# Patient Record
Sex: MALE | Race: BLACK OR AFRICAN AMERICAN | NOT HISPANIC OR LATINO | Employment: FULL TIME | URBAN - METROPOLITAN AREA
[De-identification: names, ages, dates, MRNs, and addresses within clinical notes are randomized per-mention and may not be internally consistent; named-entity substitution may affect disease eponyms.]

---

## 2021-03-31 ENCOUNTER — HOSPITAL ENCOUNTER (EMERGENCY)
Facility: HOSPITAL | Age: 31
Discharge: HOME | End: 2021-03-31
Attending: EMERGENCY MEDICINE
Payer: MEDICARE

## 2021-03-31 VITALS
DIASTOLIC BLOOD PRESSURE: 84 MMHG | BODY MASS INDEX: 36.37 KG/M2 | SYSTOLIC BLOOD PRESSURE: 148 MMHG | OXYGEN SATURATION: 99 % | WEIGHT: 240 LBS | TEMPERATURE: 98 F | RESPIRATION RATE: 18 BRPM | HEART RATE: 87 BPM | HEIGHT: 68 IN

## 2021-03-31 DIAGNOSIS — R59.1 LYMPHADENOPATHY OF HEAD AND NECK: Primary | ICD-10-CM

## 2021-03-31 DIAGNOSIS — I10 HYPERTENSION, UNSPECIFIED TYPE: ICD-10-CM

## 2021-03-31 DIAGNOSIS — J30.9 ALLERGIC RHINITIS, UNSPECIFIED SEASONALITY, UNSPECIFIED TRIGGER: ICD-10-CM

## 2021-03-31 PROCEDURE — 99284 EMERGENCY DEPT VISIT MOD MDM: CPT | Mod: 25,ER

## 2021-03-31 RX ORDER — CETIRIZINE HYDROCHLORIDE 10 MG/1
10 TABLET ORAL DAILY
Qty: 30 TABLET | Refills: 0 | Status: SHIPPED | OUTPATIENT
Start: 2021-03-31 | End: 2022-03-31

## 2021-03-31 RX ORDER — DEXTROMETHORPHAN HYDROBROMIDE, GUAIFENESIN 5; 100 MG/5ML; MG/5ML
650 LIQUID ORAL EVERY 8 HOURS
Qty: 20 TABLET | Refills: 0 | Status: SHIPPED | OUTPATIENT
Start: 2021-03-31

## 2021-03-31 RX ORDER — IBUPROFEN 600 MG/1
600 TABLET ORAL EVERY 6 HOURS PRN
Qty: 20 TABLET | Refills: 0 | Status: SHIPPED | OUTPATIENT
Start: 2021-03-31 | End: 2022-12-24 | Stop reason: ALTCHOICE

## 2021-03-31 RX ORDER — FLUTICASONE PROPIONATE 50 MCG
1 SPRAY, SUSPENSION (ML) NASAL 2 TIMES DAILY PRN
Qty: 15 G | Refills: 0 | Status: SHIPPED | OUTPATIENT
Start: 2021-03-31

## 2022-12-24 ENCOUNTER — HOSPITAL ENCOUNTER (EMERGENCY)
Facility: HOSPITAL | Age: 32
Discharge: HOME | End: 2022-12-24
Attending: EMERGENCY MEDICINE
Payer: MEDICARE

## 2022-12-24 VITALS
RESPIRATION RATE: 18 BRPM | TEMPERATURE: 99 F | BODY MASS INDEX: 31.1 KG/M2 | HEART RATE: 89 BPM | DIASTOLIC BLOOD PRESSURE: 91 MMHG | SYSTOLIC BLOOD PRESSURE: 124 MMHG | HEIGHT: 69 IN | OXYGEN SATURATION: 99 % | WEIGHT: 210 LBS

## 2022-12-24 DIAGNOSIS — S92.421A CLOSED DISPLACED FRACTURE OF DISTAL PHALANX OF RIGHT GREAT TOE, INITIAL ENCOUNTER: Primary | ICD-10-CM

## 2022-12-24 DIAGNOSIS — M79.674 GREAT TOE PAIN, RIGHT: ICD-10-CM

## 2022-12-24 PROCEDURE — 99284 EMERGENCY DEPT VISIT MOD MDM: CPT | Mod: ER

## 2022-12-24 RX ORDER — NAPROXEN 500 MG/1
500 TABLET ORAL 2 TIMES DAILY WITH MEALS
Qty: 20 TABLET | Refills: 0 | Status: SHIPPED | OUTPATIENT
Start: 2022-12-24 | End: 2023-01-03

## 2022-12-24 RX ORDER — DICLOFENAC SODIUM 10 MG/G
GEL TOPICAL
Qty: 100 G | Refills: 0 | Status: SHIPPED | OUTPATIENT
Start: 2022-12-24

## 2022-12-24 NOTE — Clinical Note
"Roberto Carlos DOYLE" Stevan was seen and treated in our emergency department on 12/24/2022.  He may return to work on 12/31/2022.       If you have any questions or concerns, please don't hesitate to call.      CLIFF Espinoza RN    "

## 2022-12-24 NOTE — ED PROVIDER NOTES
Encounter Date: 12/24/2022       History     Chief Complaint   Patient presents with    Toe Pain     PT C/O PAIN AND BRUISING TO RIGHT GREAT TOE AFTER HEAVY OBJECT FELL OFF OF REFRIDGERATOR ONTO TOE THIS AFTERNOON     The history is provided by the patient.   Leg Pain   The incident occurred at home. The injury mechanism was a direct blow (container fell from refrigerator directly onto patient's right great toe). The incident occurred yesterday. The pain is present in the right toes (right great toe). The pain has been Constant since onset. Pertinent negatives include no numbness, no inability to bear weight, no loss of motion, no muscle weakness, no loss of sensation and no tingling. He reports no foreign bodies present. The symptoms are aggravated by palpation and activity. He has tried nothing for the symptoms.   Review of patient's allergies indicates:  No Known Allergies  Past Medical History:   Diagnosis Date    Hypertension      History reviewed. No pertinent surgical history.  No family history on file.  Social History     Tobacco Use    Smoking status: Never   Substance Use Topics    Alcohol use: No    Drug use: Never     Review of Systems   Musculoskeletal:  Negative for arthralgias, gait problem and joint swelling.   Skin:  Negative for wound.   Neurological:  Negative for tingling, weakness and numbness.   All other systems reviewed and are negative.    Physical Exam     Initial Vitals [12/24/22 0024]   BP Pulse Resp Temp SpO2   (!) 135/95 93 18 98.6 °F (37 °C) 99 %      MAP       --         Physical Exam    Nursing note and vitals reviewed.  Constitutional: He appears well-developed and well-nourished. He is not diaphoretic. No distress.   HENT:   Head: Normocephalic and atraumatic.   Mouth/Throat: Oropharynx is clear and moist.   Eyes: Conjunctivae are normal.   Neck: Phonation normal. No stridor present.   Normal range of motion.  Cardiovascular:  Regular rhythm and intact distal pulses.            Pulmonary/Chest: Effort normal. No accessory muscle usage or stridor. No tachypnea. No respiratory distress.   Abdominal: He exhibits no distension. There is no abdominal tenderness.   Musculoskeletal:         General: No tenderness. Normal range of motion.      Cervical back: Normal range of motion.      Right foot: Normal range of motion and normal capillary refill. Bony tenderness present. No swelling, deformity, laceration or crepitus. Normal pulse.        Feet:      Neurological: He is alert and oriented to person, place, and time. He has normal strength. Gait normal. GCS eye subscore is 4. GCS verbal subscore is 5. GCS motor subscore is 6.   Skin: Skin is warm and intact.   Psychiatric: He has a normal mood and affect.       ED Course   Procedures  Labs Reviewed - No data to display       Imaging Results              X-Ray Toe 2 or More Views Right (Final result)  Result time 12/24/22 01:41:14      Final result by Jose Raul Dumont MD (12/24/22 01:41:14)                   Impression:      Acute minimally displaced fracture involving the distal phalanx/tuft of the right great toe.      Electronically signed by: Jose Raul Dumont MD  Date:    12/24/2022  Time:    01:41               Narrative:    EXAMINATION:  XR TOE 2 OR MORE VIEWS RIGHT    CLINICAL HISTORY:  INJURY;    TECHNIQUE:  Three views of the right toes were performed    COMPARISON:  None    FINDINGS:  There is an acute minimally displaced fracture involving the distal phalanx/tuft of the right great toe.  No evidence to suggest intra-articular involvement of the IP joint.  There is associated soft tissue edema.  The remaining visualized osseous structures appear intact without evidence of acute fracture or dislocation.                                       Medications - No data to display  Medical Decision Making:   Clinical Tests:   Radiological Study: Ordered and Reviewed  ED Management:  Right great toe and adjacent toe rajinder taped and post op shoe  applied                        Clinical Impression:   Final diagnoses:  [M79.674] Great toe pain, right  [S92.421A] Closed displaced fracture of distal phalanx of right great toe, initial encounter (Primary)        ED Disposition Condition    Discharge Stable          ED Prescriptions       Medication Sig Dispense Start Date End Date Auth. Provider    naproxen (NAPROSYN) 500 MG tablet Take 1 tablet (500 mg total) by mouth 2 (two) times daily with meals. for 10 days 20 tablet 12/24/2022 1/3/2023 Arnaud Wilkinson MD    diclofenac sodium (VOLTAREN) 1 % Gel Apply 2g  to affected area every 6 hr as needed for pain. 100 g 12/24/2022 -- Arnaud Wilkinson MD          Follow-up Information       Follow up With Specialties Details Why Contact Info    Jose Holt MD Family Medicine Call  As needed, for ongoing care [id number] [facility] [id number] [phone]      The nearest emergency department.  Go to  As needed, If symptoms worsen              Arnaud Wilkinson MD  12/26/22 0839